# Patient Record
Sex: MALE | Race: WHITE
[De-identification: names, ages, dates, MRNs, and addresses within clinical notes are randomized per-mention and may not be internally consistent; named-entity substitution may affect disease eponyms.]

---

## 2020-10-11 ENCOUNTER — HOSPITAL ENCOUNTER (EMERGENCY)
Dept: HOSPITAL 74 - JER | Age: 55
Discharge: HOME | End: 2020-10-11
Payer: COMMERCIAL

## 2020-10-11 VITALS — BODY MASS INDEX: 31.1 KG/M2

## 2020-10-11 VITALS — HEART RATE: 62 BPM | DIASTOLIC BLOOD PRESSURE: 82 MMHG | SYSTOLIC BLOOD PRESSURE: 113 MMHG

## 2020-10-11 VITALS — DIASTOLIC BLOOD PRESSURE: 80 MMHG | SYSTOLIC BLOOD PRESSURE: 125 MMHG

## 2020-10-11 VITALS — TEMPERATURE: 98.3 F

## 2020-10-11 VITALS — TEMPERATURE: 97.8 F | HEART RATE: 91 BPM

## 2020-10-11 DIAGNOSIS — R10.9: Primary | ICD-10-CM

## 2020-10-11 DIAGNOSIS — K56.41: Primary | ICD-10-CM

## 2020-10-11 LAB
ALBUMIN SERPL-MCNC: 3.9 G/DL (ref 3.4–5)
ALP SERPL-CCNC: 126 U/L (ref 45–117)
ALT SERPL-CCNC: 26 U/L (ref 13–61)
ANION GAP SERPL CALC-SCNC: 7 MMOL/L (ref 8–16)
APPEARANCE UR: CLEAR
AST SERPL-CCNC: 19 U/L (ref 15–37)
BACTERIA # UR AUTO: 1 /UL (ref 0–1359)
BASOPHILS # BLD: 0.2 % (ref 0–2)
BILIRUB SERPL-MCNC: 0.7 MG/DL (ref 0.2–1)
BILIRUB UR STRIP.AUTO-MCNC: NEGATIVE MG/DL
BUN SERPL-MCNC: 18.6 MG/DL (ref 7–18)
CALCIUM SERPL-MCNC: 9.7 MG/DL (ref 8.5–10.1)
CASTS URNS QL MICRO: 0 /UL (ref 0–3.1)
CHLORIDE SERPL-SCNC: 102 MMOL/L (ref 98–107)
CO2 SERPL-SCNC: 27 MMOL/L (ref 21–32)
COLOR UR: YELLOW
CREAT SERPL-MCNC: 1.2 MG/DL (ref 0.55–1.3)
DEPRECATED RDW RBC AUTO: 13.1 % (ref 11.9–15.9)
EOSINOPHIL # BLD: 1 % (ref 0–4.5)
EPITH CASTS URNS QL MICRO: 0 /UL (ref 0–25.1)
GLUCOSE SERPL-MCNC: 120 MG/DL (ref 74–106)
HCT VFR BLD CALC: 42.9 % (ref 35.4–49)
HGB BLD-MCNC: 14.7 GM/DL (ref 11.7–16.9)
KETONES UR QL STRIP: NEGATIVE
LEUKOCYTE ESTERASE UR QL STRIP.AUTO: NEGATIVE
LYMPHOCYTES # BLD: 7.2 % (ref 8–40)
MCH RBC QN AUTO: 30.9 PG (ref 25.7–33.7)
MCHC RBC AUTO-ENTMCNC: 34.3 G/DL (ref 32–35.9)
MCV RBC: 90.1 FL (ref 80–96)
MONOCYTES # BLD AUTO: 4.1 % (ref 3.8–10.2)
NEUTROPHILS # BLD: 87.5 % (ref 42.8–82.8)
NITRITE UR QL STRIP: NEGATIVE
PH UR: 5.5 [PH] (ref 5–8)
PLATELET # BLD AUTO: 276 K/MM3 (ref 134–434)
PMV BLD: 9.2 FL (ref 7.5–11.1)
POTASSIUM SERPLBLD-SCNC: 3.6 MMOL/L (ref 3.5–5.1)
PROT SERPL-MCNC: 7.7 G/DL (ref 6.4–8.2)
PROT UR QL STRIP: NEGATIVE
PROT UR QL STRIP: NEGATIVE
RBC # BLD AUTO: 22 /UL (ref 0–23.9)
RBC # BLD AUTO: 4.76 M/MM3 (ref 4–5.6)
SODIUM SERPL-SCNC: 136 MMOL/L (ref 136–145)
SP GR UR: 1.01 (ref 1.01–1.03)
UROBILINOGEN UR STRIP-MCNC: 0.2 MG/DL (ref 0.2–1)
WBC # BLD AUTO: 9.2 K/MM3 (ref 4–10)
WBC # UR AUTO: 1 /UL (ref 0–25.8)

## 2020-10-11 PROCEDURE — 3E033NZ INTRODUCTION OF ANALGESICS, HYPNOTICS, SEDATIVES INTO PERIPHERAL VEIN, PERCUTANEOUS APPROACH: ICD-10-PCS

## 2020-10-11 PROCEDURE — 3E033GC INTRODUCTION OF OTHER THERAPEUTIC SUBSTANCE INTO PERIPHERAL VEIN, PERCUTANEOUS APPROACH: ICD-10-PCS

## 2020-10-11 PROCEDURE — 3E0233Z INTRODUCTION OF ANTI-INFLAMMATORY INTO MUSCLE, PERCUTANEOUS APPROACH: ICD-10-PCS

## 2020-10-11 NOTE — PDOC
History of Present Illness





- General


Chief Complaint: Pain


Stated Complaint: ABD PAIN


Time Seen by Provider: 10/11/20 12:39





- History of Present Illness


Initial Comments: 





10/11/20 12:50


56yo M w/ PMHx 3stents at [facility] ([place]) this summer and presented this 

AM for the same lower ABD discomfort and constipation bounces back today with 

worsening lower ABD pain and inability to have a BM. He reports a small BM 

yesterday that did not feel like he emptied completely. 





Reports no hx of kidney stones, pain w/ urination, incomplete emptying except 

for today. 





No recent illness, fever, rash, or sore throat


10/11/20 13:31








Past History





- Medical History


Allergies/Adverse Reactions: 


                                    Allergies











Allergy/AdvReac Type Severity Reaction Status Date / Time


 


No Known Allergies Allergy   Verified 10/11/20 12:31











Home Medications: 


Ambulatory Orders





Docusate Sodium [Colace] 100 mg PO HS #60 capsule MDD 1 10/11/20 


Lidocaine 5% Patch [Lidoderm Patch -] 1 patch TP DAILY #30 patch 10/11/20 


Polyethylene Glycol 3350 [Miralax (For Bowel Prep) -] 255 gm PO DAILY 30 Days #1

btl 10/11/20 








COPD: No


HTN: Yes (no meds)





- Immunization History


Immunization Up to Date: Yes





- Psycho-Social/Smoking History


Smoking History: Never smoked


Have you smoked in the past 12 months: No





**Review of Systems





- Review of Systems


Able to Perform ROS?: Yes


Is the patient limited English proficient: Yes


Constitutional: Yes: Night Sweats (on presentation only).  No: Chills, 

Diaphoresis, Fever


HEENTM: No: Recent change in vision, Throat Swelling


Respiratory: No: Cough, Shortness of Breath


Cardiac (ROS): No: Symptoms Reported, Chest Pain


ABD/GI: Yes: Symptoms Reported, Abd. Pain w/ defecation (pain when trying to 

have a BM today), Constipated, Nausea, Vomiting, Abdominal cramping (severe, 

intermittent).  No: Diarrhea, Indigestion


: Yes: Hematuria (microscopic - this is because of her UA on prior 

presentation).  No: Burning, Dysuria, Testicular Swelling, Testicular Pain


Musculoskeletal: No: Back Pain, Muscle Pain


Integumentary: No: Bruising, Change in Color, Rash


Neurological: No: Headache, Numbness


Endocrine: No: Symptoms Reported


Hematologic/Lymphatic: No: Symptoms Reported


All Other Systems: Reviewed and Negative





*Physical Exam





- Vital Signs


                                Last Vital Signs











Temp Pulse Resp BP Pulse Ox


 


 98 F   87   18   150/95   99 


 


 10/11/20 12:28  10/11/20 12:28  10/11/20 12:28  10/11/20 12:28  10/11/20 12:28














- Physical Exam


General Appearance: Yes: Nourished, Appropriately Dressed, Mild Distress


HEENT: positive: NEERU, Normal Voice, Hearing Grossly Normal


Neck: positive: Trachea midline, Supple


Respiratory/Chest: positive: Lungs Clear, Normal Breath Sounds.  negative: Chest

Tender, Respiratory Distress


Cardiovascular: positive: Regular Rhythm, Regular Rate


Gastrointestinal/Abdominal: positive: Decreased BS, Protuberent, Distended.  

negative: Normal Bowel Sounds, Pulsatile Mass


Musculoskeletal: positive: Normal Inspection.  negative: CVA Tenderness, CVA 

Tenderness (R), CVA Tenderness (L)


Extremity: positive: Normal Capillary Refill, Normal Inspection, Normal Range of

Motion


Integumentary: positive: Normal Color, Warm, Clammy


Neurologic: positive: Fully Oriented, Alert, Normal Response, Motor Strength 5/5





Procedures





- Additional Procedures


Progress: 





10/11/20 18:45


Manual disimpaction + 2 fleet enemas resulted in loosening of the "plug." He 

reports great relief and easy urination. 





**Heart Score/ECG Review





- Electrocardiogram


EKG: Normal





ED Treatment Course





- LABORATORY


CBC & Chemistry Diagram: 


                                 10/11/20 13:20





                                 10/11/20 13:20





Medical Decision Making





- Medical Decision Making





10/11/20 14:08


56yo M bounceback lower ABD pain and no BM w/ XRAY showing dilated bowel loops. 

Pt reports urge to defecate but cannot produce stool.





pt dry heaved multiple times during interview -> zofran





no pain w/ urination, hx of kidney stones, gross hematuria, renal fxn good -> 

unlikely kidney stone. 





no hx prostate exam -> possible BPH. however, no incomplete emptying





lactate normal, no hx of AF -> unlikely mesenteric ischemia. 





not passing gas, no BM, lower ABD pain - working up SBO vs constipation.











10/11/20 14:29





10/11/20 16:03


CT read: large fecal retention. will try glycerin suppository and PO lactulose 

and reassess. 


10/12/20 13:34


disimpaction successful to loosen fecal plug -> pt was able to move bowels 

somewhat. feels tremendous relief. will DC.





Discharge





- Discharge Information


Problems reviewed: Yes


Clinical Impression/Diagnosis: 


 Fecal impaction





Disposition: HOME





- Admission


No





- Additional Discharge Information


Prescriptions: 


Docusate Sodium [Colace] 100 mg PO HS #60 capsule MDD 1


Polyethylene Glycol 3350 [Miralax (For Bowel Prep) -] 255 gm PO DAILY 30 Days #1

btl





- Follow up/Referral


Referrals: 


Jennifer Acosta MD [Primary Care Provider] - 


Mars Escobedo MD [Staff Physician] - 





- Patient Discharge Instructions


Patient Printed Discharge Instructions:  DI for Fecal Impaction, Fecal Impaction


Additional Instructions: 


You were seen in the ED today for fecal impaction. We manually disimpacted part 

of your stool burden, but there is a large volume remaining. 





Please start taking daily Miralax until you successfully evacuate your bowels. 





You may use an enema to assist with the passage of your stool burden. Please 

come back if the symptoms return. 





Please follow up with Dr Escobedo within 48hours of leaving the ED today.





- Post Discharge Activity

## 2020-10-11 NOTE — PDOC
Documentation entered by Lena Luciano SCRIBE, acting as scribe for 

Willa Wei MD.








Willa Wei MD:  This documentation has been prepared by the Ankita fletcher Sydney, SCRIBE, under my direction and personally reviewed by me in its 

entirety.  I confirm that the documentation accurately reflects all work, 

treatment, procedures, and medical decision making performed by me.  





Attending Attestation





- Resident


Resident Name: Graham Muñoz





- ED Attending Attestation


I have performed the following: I have examined & evaluated the patient, The 

case was reviewed & discussed with the resident, I agree w/resident's findings &

plan, Exceptions are as noted





- HPI


HPI: 





10/11/20 13:06


Patient is a 55 year old male with a significant past medical history of 3 

stents (at [facility]) who presents to the ED again this afternoon with the 

same lower abdominal discomfort and constipation he was evaluated for this 

morning. As per patient, he has been experiencing worsening lower abdominal pain

since being discharged this morning. He reports having a small bowel movement 

yesterday.  reports significant improvement in his symptoms after tylenol and 

zofran in the ED. 





Denies fever, chills, shortness of breath, chest pain, nausea, vomiting, 

diarrhea, or urinary changes.





Allergies: NKDA


PCP: Dr. Acosta





10/11/20 15:03








- Physicial Exam


PE: 





10/11/20 15:06


Agree with resident exam. Patient is alert and oriented and in no acute 

distress.  Abdomen is obese, soft, non tender, non distended without guarding or

rebound.  





- Medical Decision Making





10/11/20 15:20


Pt presents to the ED complaining of abdominal pain and constipation.  seen in 

the ED with negative KUB.  Presents today with worsening abdominal pain and 

vomiting.   Still unable to pass gas or have BM.  Patient does feel better after

reglan.  CT shows fecal impaction.  Patient offered disimpaction, but is 

currently refusing.  Will treat with lactulose and mag citrate and glycerin 

suppositories.  Patient will agree to disimpaction if those treatments are 

unsuccessful. 


10/11/20 15:59





10/11/20 16:02








Discharge





- Discharge Information


Problems reviewed: Yes


Clinical Impression/Diagnosis: 


 Fecal impaction





Disposition: HOME





- Additional Discharge Information


Prescriptions: 


Docusate Sodium [Colace] 100 mg PO HS #60 capsule MDD 1


Polyethylene Glycol 3350 [Miralax (For Bowel Prep) -] 255 gm PO DAILY 30 Days #1

btl





- Follow up/Referral


Referrals: 


Mars Escobedo MD [Staff Physician] - 


Jennifer Acosta MD [Primary Care Provider] - 





- Patient Discharge Instructions


Patient Printed Discharge Instructions:  DI for Fecal Impaction, Fecal Impaction


Additional Instructions: 


You were seen in the ED today for fecal impaction. We manually disimpacted part 

of your stool burden, but there is a large volume remaining. 





Please start taking daily Miralax until you successfully evacuate your bowels. 





You may use an enema to assist with the passage of your stool burden. Please 

come back if the symptoms return. 





Please follow up with Dr Escobedo within 48hours of leaving the ED today.





- Post Discharge Activity

## 2020-10-11 NOTE — PDOC
History of Present Illness





- General


Chief Complaint: Pain, Acute


Stated Complaint: PAIN


Time Seen by Provider: 10/11/20 03:54





- History of Present Illness


Initial Comments: 


55 YOM h/o HLD, HTN, CAD on stent on brilinta presents with head pain and 

abdominal pain since yesterday. Patient reports he has pain in the back of his 

head and neck which is worse with lateral rotation of his head. Denies trauma to

the area, no radiation, never had pain like this before. He reports his 

abdominal pain is located in lower abdomen, is worse with rising from bed or 

sitting or attempting to defecate. Reports last bm was yesterday, still passing 

flatus. Concerns he is constipated. Denies fever or chills, N/V/D, chest pain or

shortness of breath. 








Constitutional:  No Weight Change, No Fever, No Chills, No Night Sweats, No 

Fatigue, No Malaise


ENT/Mouth:  No Hearing Changes, No Ear Pain, No Nasal Congestion, No Sinus Pain,

No Hoarseness, No sore throat, No Rhinorrhea, No Swallowing Difficulty


Eyes:  No Eye Pain, No Swelling, No Redness, No Foreign Body, No Discharge, No 

Vision Changes


Cardiovascular:  No Chest Pain, No SOB, No PND, No Dyspnea on Exertion, No 

Orthopnea, No Claudication, No Edema, No Palpitations


Respiratory:  No Cough, No Sputum, No Wheezing, No Smoke Exposure, No Dyspnea


Gastrointestinal:  No Nausea, No Vomiting, No Diarrhea, No Constipation, No 

Pain, No Heartburn, No Anorexia, No Dysphagia, No Hematochezia, No Melena, No 

Flatulence, No Jaundice


Genitourinary:  No Dyspareunia, No Dysuria, No Urinary Frequency, No Hematuria, 

No Urinary Incontinence, No Urgency, No Flank Pain, No Urinary Flow Changes, No 

Hesitancy


Musculoskeletal:  No Arthralgias, No Myalgias, No Joint Swelling, No Joint 

Stiffness, No Back Pain, No Neck Pain, No Injury History


Skin:  No Skin Lesions, No Pruritis, No Hair Changes, No Breast/Skin Changes, No

Nipple Discharge


Neuro:  No Weakness, No Numbness, No Paresthesias, No Loss of Consciousness, No 

Syncope, No Dizziness, No Headache, No Coordination Changes, No Recent Falls


Psych:  No Anxiety/Panic, No Depression, No Insomnia, No Personality Changes, No

Delusions, No Rumination, No SI/HI/AH/VH, No Social Issues, No Memory Changes, 

No Violence/Abuse Hx., No Eating Concerns


Heme/Lymph:  No Bruising, No Bleeding, No Transfusions History, No 

Lymphadenopathy


Endocrine:  No Polyuria, No Polydipsia, No Temperature Intolerance








Past History





- Medical History


Allergies/Adverse Reactions: 


                                    Allergies











Allergy/AdvReac Type Severity Reaction Status Date / Time


 


No Known Allergies Allergy   Verified 10/11/20 03:54











Home Medications: 


Ambulatory Orders





Amoxicillin - [Amoxicillin 250mg Capsule -] 250 mg PO ASDIR 09/04/16 


Oxycodone HCl/Acetaminophen [Percocet 5-325 mg Tablet] 1 - 2 tab PO Q4H 09/04/16










HTN: Yes (no meds)





- Immunization History


Immunization Up to Date: Yes





- Psycho-Social/Smoking History


Smoking History: Never smoked





*Physical Exam





- Physical Exam


General Appearance: Yes: Nourished, Appropriately Dressed, Mild Distress


HEENT: positive: EOMI, NEERU, Normal ENT Inspection, Normal Voice, Symmetrical, 

TMs Normal, Pharynx Normal, Other (patient is ttp at posterior head on right 

side)


Neck: positive: Trachea midline, Normal Thyroid


Respiratory/Chest: positive: Lungs Clear, Normal Breath Sounds


Cardiovascular: positive: Regular Rhythm, Regular Rate, S1, S2


Gastrointestinal/Abdominal: positive: Normal Bowel Sounds, Tender, Flat, Soft, 

Tenderness


Musculoskeletal: positive: Normal Inspection, CVA Tenderness


Extremity: positive: Normal Capillary Refill, Normal Inspection


Integumentary: positive: Normal Color, Dry, Warm


Neurologic: positive: CNs II-XII NML intact, Fully Oriented, Alert, Normal 

Mood/Affect, Normal Response, Motor Strength 5/5





Medical Decision Making





- Medical Decision Making


55 YOM h/o htn, hld, CAD w stent presents with head pain and abdominal pain 


- vitals wnl


- exam reveals ttp in posterior right head and suprapubic 


- will do upright and flat radiograph, miralax, docusate, toradol for pain 

control and reassess 








10/11/20 06:40


Patient reports that pain is decreased by 50% after receiving pain meds 


Does not yet express the urge to defecate 


XR wnl


will dc patient to follow up with pcp 











Discharge





- Discharge Information


Problems reviewed: Yes


Clinical Impression/Diagnosis: 


 Abdominal pain





Condition: Good


Disposition: HOME





- Admission


No





- Follow up/Referral





- Patient Discharge Instructions


Patient Printed Discharge Instructions:  Constipation


Additional Instructions: 


You were seen in the ER for posterior head pain and constipation/abdominal pain.

While here you received and XRAY of your chest and abdomen as well as a 

urinanalysis. You also received pain medication for you symptoms. Your xray and 

urinanalysis were unremarkable. You reported that your pain had decreased by 

50%. You were considered medically stable and safe to return home. 





When you return home you may use over the counter pain medication to control you

head pain. Please follow the instructions on the label and dont take more than 

the recommended dose. To address your constipation you you make take miralaxx 

and docusate senna, please follow the instructions on the label and dont take 

more than the recommended dose. If your symptoms persist please follow up with 

your primary care provider. 





WHAT YOU NEED TO KNOW:





Constipation is when you have hard, dry bowel movements, or you go longer than 

usual between bowel movements.





DISCHARGE INSTRUCTIONS:


Call your doctor if:


You have blood in your bowel movements.


You have a fever and abdominal pain with the constipation.


Your constipation gets worse.


You start to vomit.


You have questions or concerns about your condition or care.


Medicines:


Medicine such as a laxative may help relax and loosen your intestines to help 

you have a bowel movement. Your provider may recommend you only use laxatives 

for a short time. Long-term use may make your bowels dependent on the medicine.


Take your medicine as directed. Contact your healthcare provider if you think 

your medicine is not helping or if you have side effects. Tell him of her if you

are allergic to any medicine. Keep a list of the medicines, vitamins, and herbs 

you take. Include the amounts, and when and why you take them. Bring the list or

the pill bottles to follow-up visits. Carry your medicine list with you in case 

of an emergency.





WHAT YOU NEED TO KNOW:





An acute headache is pain or discomfort that starts suddenly and gets worse 

quickly. You may have an acute headache only when you feel stress or eat certain

foods. Other acute headache pain can happen every day, and sometimes several 

times a day.





DISCHARGE INSTRUCTIONS:


Return to the emergency department if:


You have severe pain.


You have numbness or weakness on one side of your face or body.


You have a headache that occurs after a blow to the head, a fall, or other 

trauma.


You have a headache, are forgetful or confused, or have trouble speaking.


You have a headache, stiff neck, and a fever.


Contact your healthcare provider if:


You have a constant headache and are vomiting.


You have a headache each day that does not get better, even after treatment.


You have changes in your headaches, or new symptoms that occur when you have a 

headache.


You have questions or concerns about your condition or care.





- Post Discharge Activity

## 2020-10-11 NOTE — XMS
Summarization Of Episode

                           Created on:2020



Patient:EDUARD LEIJA

Sex:Male

:1965

External Reference #:[id number]





Demographics







                          Address                   [address]



                                                    [address]



                                                    [place] [address]

 

                          Home Phone                [phone]

 

                          Preferred Language        EN

 

                          Marital Status            Unknown

 

                          Moravian Affiliation     Taoism

 

                          Race                      WH

 

                          Ethnic Group              Unknown









Author







                          Organization              [facility]









Support







                Name            Relationship    Address         Phone

 

                UE              Unavailable     Unavailable     Unavailable

 

                VERNELL LEIJA          [address] [phone]



                                                [address]           



                                                [place] [address] 









Care Team Providers







                    Name                Role                Phone

 

                    Martin Macias MD Unavailable         Unavailable

 

                    ER                  Unavailable         Unavailable

 

                    Whitehall,  Rian H MD   Unavailable         Unavailable

 

                    Whitehall,  Rian H MD   Unavailable         Unavailable

 

                    Whitehall,  Rian H MD   Unavailable         Unavailable

 

                    Whitehall,  Rian H MD   Unavailable         Unavailable

 

                    Whitehall,  Rian H MD   Unavailable         Unavailable

 

                    Whitehall,  Rian H MD   Unavailable         Unavailable

 

                    Whitehall,  Rian H MD   Unavailable         Unavailable

 

                    Whitehall,  Rian H MD   Unavailable         Unavailable

 

                    Whitehall,  Rian H MD   Unavailable         Unavailable

 

                    Whitehall,  Rian H MD   Unavailable         Unavailable

 

                    Whitehall,  Rian H MD   Unavailable         Unavailable

 

                    Whitehall,  Rian H MD   Unavailable         Unavailable

 

                    Whitehall,  Rian H MD   Unavailable         Unavailable

 

                    Whitehall,  Rian H MD   Unavailable         Unavailable









Re-disclosure Warning

The records that you are about to access may contain information from federally-
assisted alcohol or drug abuse programs. If such information is present, then 
the following federally mandated warning applies: This information has been 
disclosed to you from records protected by federal confidentiality rules (42 CFR
part 2). The federal rules prohibit you from making any further disclosure of 
this information unless further disclosure is expressly permitted by the written
consent of the person to whom it pertains or as otherwise permitted by 42 CFR 
part 2. A general authorization for the release of medical or other information 
is NOT sufficient for this purpose. The Federal rules restrict any use of the 
information to criminally investigate or prosecute any alcohol or drug abuse 
patient.The records that you are about to access may contain highly sensitive 
health information, the redisclosure of which is protected by Article 27-F of 
the [facility] Public Health law. If you continue you may haveaccess to 
information: Regarding HIV / AIDS; Provided by facilities licensed or operated 
by the [facility] Office of Mental Health; or Provided by the [facility]
Office for People With Developmental Disabilities. If such information is 
present, then the following [place] mandated warning applies: This 
information has been disclosed to you from confidential records which are 
protected by state law. State law prohibits you from making any further 
disclosure of this information without the specific written consent of the 
person to whom it pertains, or as otherwise permitted by law. Any unauthorized 
further disclosure in violation of state law may result in a fine or [facility] 
sentence or both. A general authorization for the release of medical or other 
information is NOT sufficient authorization for further disclosure.



Allergies and Adverse Reactions







           Type       Description Substance  Reaction   Status     Data Source(s

)

 

           Drug allergy No Known Medication No Known Medication                 

      [facility]



                      Allergies  Allergies                        [facility]







Encounters







           Encounter  Providers  Location   Date       Indications Data Source(s

)

 

           Emergency  Attender: Martin            2019            [facility] Katherine Macias MDAttender:            01:06:00 PM EDT           

 Mack Rose MD ERAdmitter:            - 2019            [facility]



                      Martin Macias MD            06:13:00 PM EDT            









                                        Patient discharged.









           Emergency  Attender: Michael Marmolejo            2019 10:45:00 AM   

         [facility]



                      Maribell: MD            EDT - 2019            Keke Rose



                      ERAdmitter: Michael Marmolejo            01:22:00 PM T        

    [facility]



                      MD                                          







Medications







       Medication Brand  Start  Product Dose   Route  Administrative Pharmacy [facility] 

Indications         Reaction            Description         Data



          Name Date Form           Instructions Instructions                    

 Source(s)

 

     colchicine [id number] /      0.6  Oral                               Nuvan

ce



      0.6 mg oral       2019        mg                                        0.

6 mg, = 1 tab, Oral, BID, # 10 tab, 0 Refill(s), 

for gout pain, Pharmacy: [facility]/pharmacy #[id number], 1 tab Oral BID,PRN:for gout pain 

Health -



     tablet      06:40:                                              Mack



               34 PM                                              [facility]

 

     indomethaci [id number] .0 Oral                               Nuva

nce



      n 25 mg       2019        mg                                        25 mg,

 = 1 cap, Oral, TID, # 30 cap, 0 Refill(s), for 

gout pain, Pharmacy: [facility]/pharmacy #[id number], 1 cap Oral TID,PRN:for gout pain Health

-



     oral      04:58:                                              [facility]



     capsule      00 PM                                              [facility]







Insurance Providers







          Payer name Policy type Policy ID Covered   Covered party's Policy    P

latricia



                    / Coverage           party ID  relationship to Lai    Inf

ormation



                    type                          lai              

 

          HEALTH              [id number]                              [id number]



          FIRST                                                       







Problems, Conditions, and Diagnoses







           Code       Display Name Description Problem Type Effective Dates Data

 Source(s)

 

           M25.562    Pain in left Pain in left knee Diagnosis  2019 [facility]

ce Health -



                      knee                             01:06:00 PM EDT [facility]







Results







                    ID                  Date                Data Source

 

                    [id number]         2020 11:23:00 AM EDT [facility]











          Name      Value     Range     Interpretation Description Data      Sup

porting



                                        Code                Source(s) Document(s

)

 

          SARS                                              [facility]   



          coronavirus 2                                                   



          RNA                                                         









                                        This lab was ordered by [facility]

ital and reported by [facility].











                    ID                  Date                Data Source

 

                    [id number]         2020 11:38:00 AM EDT [facility]











          Name      Value     Range     Interpretation Description Data      Sup

porting



                                        Code                Source(s) Document(s

)

 

          SARS                                              [facility]   



          coronavirus 2                                                   



          RNA                                                         









                                        This lab was ordered by [facility]

ital and reported by [facility].











                    ID                  Date                Data Source

 

                    [id number]          2019 04:36:00 PM EDT [facility]











          Name      Value     Range     Interpretation Description Data      Sup

porting



                                        Code                Source(s) Document(s

)

 

          Uric Acid 7.7 mg/dL 2.3-7.6   HI                  [facility]    











                    ID                  Date                Data Source

 

                    [id number]          2019 04:36:00 PM EDT [facility]











          Name      Value     Range     Interpretation Description Data      Sup

porting



                                        Code                Source(s) Document(s

)

 

          Glucose Lvl 104       65-99     HI                  [facility]   



                    mg/dL                                   [facility]    

 

          BUN       11.0      6.0-20.0  NO                  [facility]   



                    mg/dL                                   [facility]    

 

          Creatinine 1.03      0.70-1.2  NO                  [facility]   



                    mg/dL     0                             [facility]    

 

          BUN/Creat 10.7      7.0-29.0  NO                  [facility]   



          Ratio     ratio                                   [facility]    

 

          Sodium Lvl 136       136-145   NO                  [facility]   



                    mmol/L                                  [facility]    

 

          Potassium Lvl 4.2       3.5-5.1   NO                  [facility]   



                    mmol/L                                  [facility]    

 

          Chloride  103           NO                  [facility]   



                    mmol/L                                  [facility]    

 

          CO2       27        23-29     NO                  [facility]   



                    mmol/L                                  [facility]    

 

          AGAP      6         5-15      NO                  [facility]   



                                                            [facility]    

 

          Calcium Lvl 9.6       8.6-10.0  NO                  [facility]   



                    mg/dL                                   [facility]    

 

          Total Protein 7.5       6.0-8.3   NO                  [facility]   



                    gm/dL                                   [facility]    

 

          Albumin Lvl 3.8       3.5-5.0   NO                  [facility]   



                    gm/dL                                   [facility]    

 

          Glob      3.7       2.0-4.5   NO                  [facility]   



                    gm/dL                                   [facility]    

 

          A/G Ratio 1.0       1.0-2.2   NO                  [facility]   



                    ratio                                   [facility]    

 

          Bili Total 0.7       0.3-1.2   NO                  [facility]   



                    mg/dL                                   [facility]    

 

          Alk Phos  91 IU/L       NO                  [facility]    

 

          AST       20 IU/L   15-41     NO                  [facility]    

 

          ALT       30 IU/L   7-40      NO                  [facility]    











                    ID                  Date                Data Source

 

                    [id number]          2019 04:29:00 PM EDT [facility]









                                        Added by Discern Rule GLB_ADD_GFR_CMP











          Name      Value     Range     Interpretation Code Description Data Arlyn

rce(s) Supporting



                                                                      Document(s

)

 

          eGFR-AA   >60       >=60      NO                  [facility] 



                    mL/min/[address] 

 

          eGFR-KORINA  >60       >=60      NO                  [facility] 



                    mL/min/[address] [place] [address] 











                    ID                  Date                Data Source

 

                    [id number]          2019 04:19:00 PM EDT [facility]











          Name      Value     Range     Interpretation Description Data      Sup

porting



                                        Code                Source(s) Document(s

)

 

          Neut  Auto 75.1 %    50.0-80.0 NO                  [facility]    

 

          Lymph Auto 13.5 %    14.0-44.0 LO                  [facility]    

 

          Mono Auto 10.6 %    0.0-12.0  NO                  [facility]    

 

          Eos Auto  0.4 %     0.0-7.0   NO                  [facility]    

 

          Baso  Auto 0.4 %     0.0-3.0   NO                  [facility]    

 

          Neut      5.6       2.0-8.4   NO                  [facility]   



          Absolute  x10(3)/[facility]    

 

          Lymph     1.0       0.6-4.8   NO                  [facility]   



          Absolute  x10(3)/[facility]    

 

          Mono      0.8       0.0-1.1   NO                  [facility]   



          Absolute  x10(3)/[facility]    

 

          Eos Absolute 0.0       0.0-0.5   NO                  [facility]   



                    x10(3)/[facility]    

 

          Baso      0.0       0.0-0.3   NO                  [facility]   



          Absolute  x10(3)/[facility]    











                    ID                  Date                Data Source

 

                    [id number]          2019 04:19:00 PM EDT [facility]











          Name      Value     Range     Interpretation Description Data      Sup

porting



                                        Code                Source(s) Document(s

)

 

          WBC       7.4       4.0-10.5  NO                  [facility]   



                    x10(3)/[facility]    

 

          RBC       4.95      4.70-6.00 NO                  [facility]   



                    x10(6)/[facility]    

 

          Hgb       14.9      13.5-17.0 NO                  [facility]   



                    gm/dL                                   [facility]    

 

          Hct       44.3 %    38.0-51.0 NO                  [facility]    

 

          MCV       90 fL     80-98     NO                  [facility]    

 

          MCH       30.2 pg   26.0-34.0 NO                  [facility]    

 

          MCHC      33.7      32.0-36.0 NO                  [facility]   



                    gm/dL                                   [facility]    

 

          RDW       12.8 %    11.0-15.0 NO                  [facility]    

 

          Platelet  234       150-400   NO                  [facility]   



                    x10(3)/mc                               [facility]    

 

          MPV       8.3 fL    8.5-13.0  LO                  [facility]    











                    ID                  Date                Data Source

 

                    [id number]          10/02/2019 02:05:00 PM EDT [facility]









                                                   1965               19

[phone]



                                        Microbiology - Wound/Misc CulturesPROCED

URE:                Culture,Body Fluid 

with



                                        ACCESSION:                [id number]   

                       Gram StainSOURCE:



                                                     Joint Fl                 [facility]

DY SITE:                Knee 

LeftCOLLECTED



                                        DATE/TIME:      2019 15:55 EDT     

 RECEIVED DATE/TIME:       2019 

16:01



                                        EDTSTART DATE/TIME:          2019 1

6:01 EDT      FREE TEXT SOURCE:ORDERING



                                        PHYSICIAN:       Gilberto TEJADA, Martin FUENTES*

**FINAL REPORTS***Final Report  

[]Reported



                                        Date/Time: 10/2/2019 14:05 EDTNo growth 

aerobically or anaerobically in 7



                                        days.***PRELIMINARY REPORTS***Preliminar

y Report  []Reported Date/Time: 

2019



                                        11:28 EDTNo growth aerobically or anaero

bically in 5 days.  Continuing



                                        incubationPreliminary Report  []Reported

 Date/Time: 2019 08:34 EDTNo growth



                                        aerobically or anaerobically in 4 days. 

 Continuing incubationPreliminary Report



                                        []Reported Date/Time: 2019 08:55 ED

TThis culture is in progress and is 

being



                                        reincubated  Continuing incubationPrelim

inary Report  []Reported Date/Time: 

2019



                                        10:17 EDTNo growth aerobically or anaero

bically in 2 days.  Continuing



                                        incubationPreliminary Report  []Reported

 Date/Time: 2019 11:30 EDTNo growth

 to



                                        date  Continuing incubation***STAINS***G

S  []Reported Date/Time: 2019 16:43



                                        EDTModerate White Blood Cells  No organi

sms seen.











          Name      Value     Range     Interpretation Code Description Data Arlyn

rce(s) Supporting



                                                                      Document(s

)

 

                                                                      











                    ID                  Date                Data Source

 

                    [id number]          2019 07:20:00 AM EDT [facility]











          Name      Value     Range     Interpretation Code Description Data Arlyn

rce(s) Supporting



                                                                      Document(s

)

 

          Uric Acid           <8.0      NA                  [facility] 









                                        This test was performed at:[employer]

[facility]



                                        [place] 









          BF Laterality                     NA                  [facility] 











                    ID                  Date                Data Source

 

                    [id number]          2019 05:49:00 PM EDT [facility]











          Name      Value     Range     Interpretation Description Data      Sup

porting



                                        Code                Source(s) Document(s

)

 

          Crystal BF Type                     NO                  [facility]    

 

          Crystal BF                     NO                  [facility]    

 

          Crystal Desc                     NO                  [facility]    

 

          BF Laterality                     NA                  [facility]    











                    ID                  Date                Data Source

 

                    [id number]          2019 04:39:00 PM EDT [facility]











          Name      Value     Range     Interpretation Description Data      Sup

porting



                                        Code                Source(s) Document(s

)

 

          Color BF 1                     NO                  [facility]    

 

          Appear BF 1           Clear     AB                  [facility]    

 

          WBC BF 1  16970     0-200     HI                  [facility]   



                    cells/[facility]    

 

          RBC BF 1  7574      0-200     HI                  [facility]   



                    cells/[facility]    

 

          BF Type Tube 1                     NA                  [facility]    

 

          BF Laterality                     [facility]    









                                        Procedure

 

                                        







Social History







           Code       Duration   Value      Status     Description Data Source(s

)

 

           Smoking    2019 Never smoked completed  Never smoked [facility] 

alth -



                      03:47:35 PM EDT tobacco               tobacco (finding) [facility]



                                 (finding)                        [facility]







Vital Signs







                    ID                  Date                Data Source

 

                    UNK                                     











           Name       Value      Range      Interpretation Code Description Data

 Source(s)

 

           Respiratory rate 17 br/min  14-20      Normal (applies to 17 br/min  

[facility]



                                 br/min     non-numeric results)            - [facility]

 

           Mean blood 114 mm[Hg]                       114 mm[Hg] [facility]



           pressure by                                             - [facility]

 

           Diastolic blood 92 mm[Hg]  60-90 mmHg            92 mm[Hg]  [facility]

ealth



           pressure                                               - [facility]

 

           Systolic blood 159 mm[Hg]  mmHg Above high normal 159 mm[Hg] [facility]



           pressure                                               - [facility]

 

           Oxygen saturation 96 %        %   Normal (applies to 96 %      

 [facility] [employer]



           in Blood                         non-numeric results)            - [facility]



           Postductal by                                             [place]



           Pulse oximetry                                             Medical 

nter

 

           Heart rate 90 bpm      bpm Normal (applies to 90 bpm     [facility]



                                            non-numeric results)            - [facility]

 

           Body weight 95.55 kg                         95.55 kg   [facility]



           Measured                                               - [facility]

 

           Body height 173 cm                           173 cm     [facility]



                                                                  - [facility]

 

           Body mass index 31.93 kg/m2                       31.93 kg/m2 [facility]



           (BMI) [Ratio]                                             - [facility]

 

           Oxygen saturation 96 %        %   Normal (applies to 96 %      

 [facility] [employer]



           in Blood                         non-numeric results)            - [facility]



           Postductal by                                             [place]



           Pulse oximetry                                             Medical 

nter

 

           Respiratory rate 19 br/min  14-20      Normal (applies to 19 br/min  

[facility]



                                 br/min     non-numeric results)            - [facility]

 

           Heart rate 113 bpm     bpm Above high normal 113 bpm    [facility]



                                                                  - [facility]

 

           Body mass index 31.93 kg/m2                       31.93 kg/m2 [facility]



           (BMI) [Ratio]                                             - [facility]

 

           Oral temperature 99 [degF]  96.4-99.1  Normal (applies to 99 [degF]  

[facility]



                                 DegF       non-numeric results)            - [facility]

 

           Diastolic blood 91 mm[Hg]  60-90 mmHg Above high normal 91 mm[Hg]  [facility]



           pressure                                               - [facility]

 

           Systolic blood 156 mm[Hg]  mmHg Above high normal 156 mm[Hg] [facility]



           pressure                                               - [facility]







Patient Treatment Plan of Care







             Planned Activity Planned Date Details      Description  Data Source

(s)

 

             colchicine 0.6 mg oral 2019 06:40:34                         

  [email or web address] -



             tablet       PM EDT                                 [facility]

 

             indomethacin 25 mg oral 2019 04:58:00                        

   [facility] -



             capsule      PM EDT                                 [facility]

## 2020-10-11 NOTE — PDOC
Attending Attestation





- Resident


Resident Name: Alan Oviedo





- ED Attending Attestation


I have performed the following: I have examined & evaluated the patient, The 

case was reviewed & discussed with the resident, I agree w/resident's findings &

plan





- HPI


HPI: 





10/11/20 05:40


Pt comes with neck pain and 1 day of constipation.


Pt has no hx of abd surg and he has no hx of heavy liftin, groin pain or hernia 

ventral or umbilical or otherwise.





- Physicial Exam


PE: 





10/11/20 05:44


Normal exam. 


Pt has no rebound and no guarding.


Pt has no flank pain


heart and lungs normal


no ext swelling


heent: normal





- Medical Decision Making





10/11/20 05:45


Pt will have a UA because he mentions that he has to push to pee


also he will get pain meds and mm relaxants for the pain


he will get a KUB to eval his abdomen and  constipation 


10/11/20 06:49


FUA normal; pt will be discharged home








Discharge





- Discharge Information


Problems reviewed: Yes


Clinical Impression/Diagnosis: 


 Muscle spasms of neck





Constipated


Qualifiers:


 Constipation type: unspecified constipation type Qualified Code(s): K59.00 - 

Constipation, unspecified





Abdominal pain


Qualifiers:


 Abdominal location: generalized Qualified Code(s): R10.84 - Generalized 

abdominal pain





Condition: Good


Disposition: HOME





- Additional Discharge Information


Prescriptions: 


Lidocaine 5% Patch [Lidoderm Patch -] 1 patch TP DAILY #30 patch





- Follow up/Referral





- Patient Discharge Instructions


Patient Printed Discharge Instructions:  Eating a Diet Rich in Fruits and 

Vegetables, Constipation, DI for Muscle Spasm


Additional Instructions: 


You were seen in the ER for posterior head pain and constipation/abdominal pain.

While here you received and XRAY of your chest and abdomen as well as a 

urinanalysis. You also received pain medication for you symptoms. Your xray and 

urinanalysis were unremarkable. You reported that your pain had decreased by 

50%. You were considered medically stable and safe to return home. 





When you return home you may use over the counter pain medication to control you

head pain. Please follow the instructions on the label and dont take more than 

the recommended dose. To address your constipation you you make take miralaxx 

and docusate senna, please follow the instructions on the label and dont take 

more than the recommended dose. If your symptoms persist please follow up with 

your primary care provider. 





WHAT YOU NEED TO KNOW:





Constipation is when you have hard, dry bowel movements, or you go longer than 

usual between bowel movements.





DISCHARGE INSTRUCTIONS:


Call your doctor if:


You have blood in your bowel movements.


You have a fever and abdominal pain with the constipation.


Your constipation gets worse.


You start to vomit.


You have questions or concerns about your condition or care.


Medicines:


Medicine such as a laxative may help relax and loosen your intestines to help 

you have a bowel movement. Your provider may recommend you only use laxatives 

for a short time. Long-term use may make your bowels dependent on the medicine.


Take your medicine as directed. Contact your healthcare provider if you think 

your medicine is not helping or if you have side effects. Tell him of her if you

are allergic to any medicine. Keep a list of the medicines, vitamins, and herbs 

you take. Include the amounts, and when and why you take them. Bring the list or

the pill bottles to follow-up visits. Carry your medicine list with you in case 

of an emergency.





WHAT YOU NEED TO KNOW:





An acute headache is pain or discomfort that starts suddenly and gets worse 

quickly. You may have an acute headache only when you feel stress or eat certain

foods. Other acute headache pain can happen every day, and sometimes several 

times a day.





DISCHARGE INSTRUCTIONS:


Return to the emergency department if:


You have severe pain.


You have numbness or weakness on one side of your face or body.


You have a headache that occurs after a blow to the head, a fall, or other 

trauma.


You have a headache, are forgetful or confused, or have trouble speaking.


You have a headache, stiff neck, and a fever.


Contact your healthcare provider if:


You have a constant headache and are vomiting.


You have a headache each day that does not get better, even after treatment.


You have changes in your headaches, or new symptoms that occur when you have a 

headache.


You have questions or concerns about your condition or care.





- Post Discharge Activity


Work/Back to School Note:  Back to Work

## 2020-10-11 NOTE — XMS
Summarization Of Episode

                           Created on:2020



Patient:EDUARD LEIJA

Sex:Male

:1965

External Reference #:[id number]





Demographics







                          Address                   [address] [place] [address]

 

                          Home Phone                [phone]

 

                          Preferred Language        EN

 

                          Marital Status            Unknown

 

                          Church Affiliation     Anabaptist

 

                          Race                      WH

 

                          Ethnic Group              Unknown









Author







                          Organization              [facility]









Support







                Name            Relationship    Address         Phone

 

                SE              Unavailable     Unavailable     Unavailable

 

                UE, UNEMPLOYED  Unavailable     Unavailable     Unavailable

 

                UE              Unavailable     Unavailable     Unavailable

 

                VERNELL LEIJA NEPHEW          [address] [facility] [phone]



                                                [address]           



                                                [place], [place] [address] 

 

                VERNELL LEIJA Other           [address] [facility] Unavailable



                                                [place] [address] 









Care Team Providers







                    Name                Role                Phone

 

                    Martin Macias MD Unavailable         Unavailable

 

                    ER                  Unavailable         Unavailable

 

                    Doddridge,  Rian H MD   Unavailable         Unavailable

 

                    Doddridge,  Rian H MD   Unavailable         Unavailable

 

                    Doddridge,  Rian H MD   Unavailable         Unavailable

 

                    Doddridge,  Rian H MD   Unavailable         Unavailable

 

                    Doddridge,  Rian H MD   Unavailable         Unavailable

 

                    Doddridge,  Rian H MD   Unavailable         Unavailable

 

                    Doddridge,  Rian H MD   Unavailable         Unavailable

 

                    Doddridge,  Rian H MD   Unavailable         Unavailable

 

                    Doddridge,  Rian H MD   Unavailable         Unavailable

 

                    Doddridge,  Rian H MD   Unavailable         Unavailable

 

                    Doddridge,  Rian H MD   Unavailable         Unavailable

 

                    Doddridge,  Rian H MD   Unavailable         Unavailable

 

                    Doddridge,  Rian H MD   Unavailable         Unavailable

 

                    Doddridge,  Rian H MD   Unavailable         Unavailable









Re-disclosure Warning

The records that you are about to access may contain information from federally-
assisted alcohol or drug abuse programs. If such information is present, then 
the following federally mandated warning applies: This information has been 
disclosed to you from records protected by federal confidentiality rules (42 CFR
part 2). The federal rules prohibit you from making any further disclosure of 
this information unless further disclosure is expressly permitted by the written
consent of the person to whom it pertains or as otherwise permitted by 42 CFR 
part 2. A general authorization for the release of medical or other information 
is NOT sufficient for this purpose. The Federal rules restrict any use of the 
information to criminally investigate or prosecute any alcohol or drug abuse 
patient.The records that you are about to access may contain highly sensitive 
health information, the redisclosure of which is protected by Article 27-F of 
the [place] Public Health law. If you continue you may haveaccess to 
information: Regarding HIV / AIDS; Provided by facilities licensed or operated 
by the [facility] Office of Mental Health; or Provided by the [facility]
Office for People With Developmental Disabilities. If such information is 
present, then the following [place] mandated warning applies: This 
information has been disclosed to you from confidential records which are 
protected by state law. State law prohibits you from making any further 
disclosure of this information without the specific written consent of the 
person to whom it pertains, or as otherwise permitted by law. Any unauthorized 
further disclosure in violation of state law may result in a fine or [facility] 
sentence or both. A general authorization for the release of medical or other 
information is NOT sufficient authorization for further disclosure.



Allergies and Adverse Reactions







           Type       Description Substance  Reaction   Status     Data Source(s

)

 

           Drug allergy No Known Medication No Known Medication                 

      [facility]



                      Allergies  Allergies                        [facility]







Encounters







           Encounter  Providers  Location   Date       Indications Data Source(s

)

 

           Emergency  Attender: Martin            2019            [facility] Katherine Macias MDAttender:            01:06:00 PM EDT           

 Mack Rose MD ERAdmitter:            - 2019            [facility]



                      Martin Macias MD            06:13:00 PM EDT            









                                        Patient discharged.









           Emergency  Attender: Michael Marmolejo            2019 10:45:00 AM   

         [facility] Katherine Reyna: MD            EDT - 2019            [facility] Rose



                      ERAdmitter: Michael Marmolejo            01:22:00 PM T        

    [facility]



                      MD                                          







Medications







       Medication Brand  Start  Product Dose   Route  Administrative Pharmacy [facility] 

Indications         Reaction            Description         Data



          Name Date Form           Instructions Instructions                    

 Source(s)

 

     colchicine [id number] /      0.6  Oral                               [facility]

ce



      0.6 mg oral       2019        mg                                        0.

6 mg, = 1 tab, Oral, BID, # 10 tab, 0 Refill(s), 

for gout pain, Pharmacy: [id number], 1 tab Oral BID,PRN:for gout pain 

Health -



     tablet      06:40:                                              [place]



               34 PM                                              [facility]

 

     indomethaci [id number] .0 Oral                               [facility]

nce



      n 25 mg       2019        mg                                        25 mg,

 = 1 cap, Oral, TID, # 30 cap, 0 Refill(s), for 

gout pain, Pharmacy: [id number], 1 cap Oral TID,PRN:for gout pain Health

-



     oral      04:58:                                              Mack



     capsule      00 PM                                              [facility]







Insurance Providers







          Payer name Policy type Policy ID Covered   Covered party's Policy    P

latricia



                    / Coverage           party ID  relationship to Lai    Inf

ormation



                    type                          lai              

 

          HEALTH              [id number]                              [id number]



          FIRST                                                       







Problems, Conditions, and Diagnoses







           Code       Display Name Description Problem Type Effective Dates Data

 Source(s)

 

           M25.562    Pain in left Pain in left knee Diagnosis  2019 [facility]

ce Health -



                      knee                             01:06:00 PM EDT [facility]







Results







                    ID                  Date                Data Source

 

                    [id number]         2020 11:23:00 AM EDT [facility]











          Name      Value     Range     Interpretation Description Data      Sup

porting



                                        Code                Source(s) Document(s

)

 

          SARS                                              [facility]   



          coronavirus 2                                                   



          RNA                                                         









                                        This lab was ordered by [facility]

ital and reported by [facility].











                    ID                  Date                Data Source

 

                    [id number]         2020 11:38:00 AM EDT [facility]











          Name      Value     Range     Interpretation Description Data      Sup

porting



                                        Code                Source(s) Document(s

)

 

          SARS                                              [facility]   



          coronavirus 2                                                   



          RNA                                                         









                                        This lab was ordered by [facility]

ital and reported by [facility].











                    ID                  Date                Data Source

 

                    [id number]          2019 04:36:00 PM EDT [facility]











          Name      Value     Range     Interpretation Description Data      Sup

porting



                                        Code                Source(s) Document(s

)

 

          Uric Acid 7.7 mg/dL 2.3-7.6   HI                  [facility]    











                    ID                  Date                Data Source

 

                    [id number]          2019 04:36:00 PM EDT [facility]











          Name      Value     Range     Interpretation Description Data      Sup

porting



                                        Code                Source(s) Document(s

)

 

          Glucose Lvl 104       65-99     HI                  [facility]   



                    mg/dL                                   [facility]    

 

          BUN       11.0      6.0-20.0  NO                  [facility]   



                    mg/dL                                   [facility]    

 

          Creatinine 1.03      0.70-1.2  NO                  [facility]   



                    mg/dL     0                             [facility]    

 

          BUN/Creat 10.7      7.0-29.0  NO                  [facility]   



          Ratio     ratio                                   [facility]    

 

          Sodium Lvl 136       136-145   NO                  [facility]   



                    mmol/L                                  [facility]    

 

          Potassium Lvl 4.2       3.5-5.1   NO                  [facility]   



                    mmol/L                                  [facility]    

 

          Chloride  103           NO                  [facility]   



                    mmol/L                                  [facility]    

 

          CO2       27        23-29     NO                  [facility]   



                    mmol/L                                  [facility]    

 

          AGAP      6         5-15      NO                  [facility]   



                                                            [facility]    

 

          Calcium Lvl 9.6       8.6-10.0  NO                  [facility]   



                    mg/dL                                   [facility]    

 

          Total Protein 7.5       6.0-8.3   NO                  [facility]   



                    gm/dL                                   [facility]    

 

          Albumin Lvl 3.8       3.5-5.0   NO                  [facility]   



                    gm/dL                                   [facility]    

 

          Glob      3.7       2.0-4.5   NO                  [facility]   



                    gm/dL                                   [facility]    

 

          A/G Ratio 1.0       1.0-2.2   NO                  [facility]   



                    ratio                                   [facility]    

 

          Bili Total 0.7       0.3-1.2   NO                  [facility]   



                    mg/dL                                   [facility]    

 

          Alk Phos  91 IU/L       NO                  [facility]    

 

          AST       20 IU/L   15-41     NO                  [facility]    

 

          ALT       30 IU/L   7-40      NO                  [facility]    











                    ID                  Date                Data Source

 

                    [id number]          2019 04:29:00 PM EDT [facility]









                                        Added by Discern Rule GLB_ADD_GFR_CMP











          Name      Value     Range     Interpretation Code Description Data Arlyn

rce(s) Supporting



                                                                      Document(s

)

 

          eGFR-AA   >60       >=60      NO                  [facility] 



                    mL/min/[address] 

 

          eGFR-KORINA  >60       >=60      NO                  [facility] 



                    mL/min/[address] 











                    ID                  Date                Data Source

 

                    [id number]          2019 04:19:00 PM EDT [facility]











          Name      Value     Range     Interpretation Description Data      Sup

porting



                                        Code                Source(s) Document(s

)

 

          Neut  Auto 75.1 %    50.0-80.0 NO                  [facility]    

 

          Lymph Auto 13.5 %    14.0-44.0 LO                  [facility]    

 

          Mono Auto 10.6 %    0.0-12.0  NO                  [facility]    

 

          Eos Auto  0.4 %     0.0-7.0   NO                  [facility]    

 

          Baso  Auto 0.4 %     0.0-3.0   NO                  [facility]    

 

          Neut      5.6       2.0-8.4   NO                  [facility]   



          Absolute  x10(3)/[facility]    

 

          Lymph     1.0       0.6-4.8   NO                  [facility]   



          Absolute  x10(3)/[facility]    

 

          Mono      0.8       0.0-1.1   NO                  [facility]   



          Absolute  x10(3)/[facility]    

 

          Eos Absolute 0.0       0.0-0.5   NO                  [facility]   



                    x10(3)/[facility]    

 

          Baso      0.0       0.0-0.3   NO                  [facility]   



          Absolute  x10(3)/[facility]    











                    ID                  Date                Data Source

 

                    [id number]          2019 04:19:00 PM EDT [facility]











          Name      Value     Range     Interpretation Description Data      Sup

porting



                                        Code                Source(s) Document(s

)

 

          WBC       7.4       4.0-10.5  NO                  [facility]   



                    x10(3)/[facility]    

 

          RBC       4.95      4.70-6.00 NO                  [facility]   



                    x10(6)/[facility]    

 

          Hgb       14.9      13.5-17.0 NO                  [facility]   



                    gm/dL                                   [facility]    

 

          Hct       44.3 %    38.0-51.0 NO                  [facility]    

 

          MCV       90 fL     80-98     NO                  [facility]    

 

          MCH       30.2 pg   26.0-34.0 NO                  [facility]    

 

          MCHC      33.7      32.0-36.0 NO                  [facility]   



                    gm/dL                                   [facility]    

 

          RDW       12.8 %    11.0-15.0 NO                  [facility]    

 

          Platelet  234       150-400   NO                  [facility]   



                    x10(3)/mc                               [facility]    

 

          MPV       8.3 fL    8.5-13.0  LO                  [facility]    











                    ID                  Date                Data Source

 

                    [id number]          10/02/2019 02:05:00 PM EDT [facility]









                                                   1965               19

[phone]



                                        Microbiology - Wound/Misc CulturesPROCED

URE:                Culture,Body Fluid 

with



                                        ACCESSION:                [id number]   

                       Gram StainSOURCE:



                                                     Joint Fl                 [facility]

DY SITE:                Knee 

LeftCOLLECTED



                                        DATE/TIME:      2019 15:55 EDT     

 RECEIVED DATE/TIME:       2019 

16:01



                                        EDTSTART DATE/TIME:          2019 1

6:01 EDT      FREE TEXT SOURCE:ORDERING



                                        PHYSICIAN:       Gilberto TEJADA, Martin FUENTES*

**FINAL REPORTS***Final Report  

[]Reported



                                        Date/Time: 10/2/2019 14:05 EDTNo growth 

aerobically or anaerobically in 7



                                        days.***PRELIMINARY REPORTS***Preliminar

y Report  []Reported Date/Time: 

2019



                                        11:28 EDTNo growth aerobically or anaero

bically in 5 days.  Continuing



                                        incubationPreliminary Report  []Reported

 Date/Time: 2019 08:34 EDTNo growth



                                        aerobically or anaerobically in 4 days. 

 Continuing incubationPreliminary Report



                                        []Reported Date/Time: 2019 08:55 ED

TThis culture is in progress and is 

being



                                        reincubated  Continuing incubationPrelim

inary Report  []Reported Date/Time: 

2019



                                        10:17 EDTNo growth aerobically or anaero

bically in 2 days.  Continuing



                                        incubationPreliminary Report  []Reported

 Date/Time: 2019 11:30 EDTNo growth

 to



                                        date  Continuing incubation***STAINS***G

S  []Reported Date/Time: 2019 16:43



                                        EDTModerate White Blood Cells  No organi

sms seen.











          Name      Value     Range     Interpretation Code Description Data Arlyn

rce(s) Supporting



                                                                      Document(s

)

 

                                                                      











                    ID                  Date                Data Source

 

                    [id number]          2019 07:20:00 AM EDT [facility]











          Name      Value     Range     Interpretation Code Description Data Arlyn

rce(s) Supporting



                                                                      Document(s

)

 

          Uric Acid           <8.0      NA                  [facility] 









                                        This test was performed at:[id number]



                                        [place], [place] 









          BF Laterality                     NA                  [facility] 











                    ID                  Date                Data Source

 

                    [id number]          2019 05:49:00 PM EDT [facility]











          Name      Value     Range     Interpretation Description Data      Sup

porting



                                        Code                Source(s) Document(s

)

 

          Crystal BF Type                     NO                  [facility]    

 

          Crystal BF                     NO                  [facility]    

 

          Crystal Desc                     NO                  [facility]    

 

          BF Laterality                     NA                  [facility]    











                    ID                  Date                Data Source

 

                    [id number]          2019 04:39:00 PM EDT [facility]











          Name      Value     Range     Interpretation Description Data      Sup

porting



                                        Code                Source(s) Document(s

)

 

          Color BF 1                     NO                  [facility]    

 

          Appear BF 1           Clear     AB                  [facility]    

 

          WBC BF 1  53838     0-200     HI                  [facility]   



                    cells/[facility]    

 

          RBC BF 1  7574      0-200     HI                  [facility]   



                    cells/[facility]    

 

          BF Type Tube 1                     NA                  [facility]    

 

          BF Laterality                     NA                  [facility]    









                                        Procedure

 

                                        







Social History







           Code       Duration   Value      Status     Description Data Source(s

)

 

           Smoking    2019 Never smoked completed  Never smoked [facility]

alth -



                      03:47:35 PM EDT tobacco               tobacco (finding) [facility]



                                 (finding)                        [facility]







Vital Signs







                    ID                  Date                Data Source

 

                    UNK                                     











           Name       Value      Range      Interpretation Code Description Data

 Source(s)

 

           Respiratory rate 17 br/min  14-20      Normal (applies to 17 br/min  

[facility]



                                 br/min     non-numeric results)            - [facility]

 

           Mean blood 114 mm[Hg]                       114 mm[Hg] [facility]



           pressure by                                             - [facility]

 

           Diastolic blood 92 mm[Hg]  60-90 mmHg            92 mm[Hg]  [facility]

ealth



           pressure                                               - [facility]

 

           Systolic blood 159 mm[Hg]  mmHg Above high normal 159 mm[Hg] [facility]



           pressure                                               - [facility]

 

           Oxygen saturation 96 %        %   Normal (applies to 96 %      

 [facility]



           in Blood                         non-numeric results)            - [facility]



           Postductal by                                             [place]



           Pulse oximetry                                             Medical 

nter

 

           Heart rate 90 bpm      bpm Normal (applies to 90 bpm     [facility]



                                            non-numeric results)            - [facility]

 

           Body weight 95.55 kg                         95.55 kg   [facility]



           Measured                                               - [facility]

 

           Body height 173 cm                           173 cm     [facility]



                                                                  - [facility]

 

           Body mass index 31.93 kg/m2                       31.93 kg/m2 [facility]



           (BMI) [Ratio]                                             - [facility]

 

           Oxygen saturation 96 %        %   Normal (applies to 96 %      

 [facility]



           in Blood                         non-numeric results)            - [facility]



           Postductal by                                             [place]



           Pulse oximetry                                             [facility]

nter

 

           Respiratory rate 19 br/min  14-20      Normal (applies to 19 br/min  

[facility]



                                 br/min     non-numeric results)            - [facility]

 

           Heart rate 113 bpm     bpm Above high normal 113 bpm    [facility]



                                                                  - [facility]

 

           Body mass index 31.93 kg/m2                       31.93 kg/m2 [facility]



           (BMI) [Ratio]                                             - [facility]

 

           Oral temperature 99 [degF]  96.4-99.1  Normal (applies to 99 [degF]  

[facility]



                                 DegF       non-numeric results)            - [facility]

 

           Diastolic blood 91 mm[Hg]  60-90 mmHg Above high normal 91 mm[Hg]  [facility]



           pressure                                               - [facility]

 

           Systolic blood 156 mm[Hg]  mmHg Above high normal 156 mm[Hg] [facility]







Patient Treatment Plan of Care







             Planned Activity Planned Date Details      Description  Data Source

(s)

 

             colchicine 0.6 mg oral 2019 06:40:34                         

  [facility]



             tablet       PM EDT                                 [facility]

 

             indomethacin 25 mg oral 2019 04:58:00                        

   [facility] -



             capsule      PM EDT                                 [facility]